# Patient Record
Sex: FEMALE | Race: WHITE
[De-identification: names, ages, dates, MRNs, and addresses within clinical notes are randomized per-mention and may not be internally consistent; named-entity substitution may affect disease eponyms.]

---

## 2017-12-29 ENCOUNTER — HOSPITAL ENCOUNTER (EMERGENCY)
Dept: HOSPITAL 25 - UCEAST | Age: 52
Discharge: HOME | End: 2017-12-29
Payer: COMMERCIAL

## 2017-12-29 VITALS — DIASTOLIC BLOOD PRESSURE: 78 MMHG | SYSTOLIC BLOOD PRESSURE: 130 MMHG
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
## 2017-12-29 DIAGNOSIS — M79.671: Primary | ICD-10-CM

## 2017-12-29 DIAGNOSIS — S29.011A: ICD-10-CM

## 2017-12-29 PROCEDURE — 99213 OFFICE O/P EST LOW 20 MIN: CPT

## 2017-12-29 PROCEDURE — G0463 HOSPITAL OUTPT CLINIC VISIT: HCPCS

## 2017-12-29 NOTE — RAD
INDICATION: Right rib pain     



COMPARISON: None



TECHNIQUE: Multiple views of the ribs were obtained.



FINDINGS: 



Bones: There is no evidence of acute rib fracture.



LUNGS: The lungs are clear. There is no pneumothorax.



Pleural spaces: There is no evidence of hemothorax.



Other: None 



IMPRESSION: NEGATIVE EXAMINATION.

## 2017-12-29 NOTE — UC
Breast Complaint





- HPI Summary


HPI Summary: 





Pt presents with two complaints


1) Right side pain that radiates under her right breast. This pain started 

about 2 months ago and is worse with movement. She works at a  and 

reports having to lift and carry children often - thinks maybe she could have 

injured something doing that, but doesn't recall a specific injury. She gets a 

mammogram yearly and has done self breast exams without abnormalities. She has 

not taken anything for this pain. Denies fever, chills, chest pain, SOB, 

abdominal pain, N/V/D/C, or pain with breathing.





2) She also complains of right plantar surface pain for the past month. She 

reports a stabbing pain on the foot pad just below her 2nd and 3rd toes. Is 

painful at all times of the day. She has not taken anything for this pain or 

tried any shoe inserts. She has not changed her footwear recently and denies 

injury.





- History of Current Complaint


Hx Obtained From: Patient





- Allergy/Home Medications


Allergies/Adverse Reactions: 


 Allergies











Allergy/AdvReac Type Severity Reaction Status Date / Time


 


No Known Allergies Allergy   Verified 12/29/17 09:38











Home Medications: 


 Home Medications





Acetaminophen [Tylenol 8 Hour Arthritis] 2 tab PO BID PRN 12/29/17 [History 

Confirmed 12/29/17]











PMH/Surg Hx/FS Hx/Imm Hx


Previously Healthy: Yes





- Surgical History


Surgical History: Yes


Surgery Procedure, Year, and Place: UTERINE FIBROID REMOVAL





- Family History


Known Family History: Positive: None





- Social History


Occupation: Employed Full-time


Lives: With Family


Alcohol Use: Occasionally


Substance Use Type: None


Smoking Status (MU): Light Every Day Tobacco Smoker


Type: Cigarettes


Amount Used/How Often: 5 CIG/DAY


Length of Time of Smoking/Using Tobacco: since age 20


Have You Smoked in the Last Year: Yes


Cessation Counseling: Counseled 3+Min - 10 Min





Review of Systems


Constitutional: Negative


Skin: Negative


Respiratory: Negative


Cardiovascular: Negative


Gastrointestinal: Negative


Motor: Negative


Neurovascular: Negative


Musculoskeletal: Other: - Pain right ribs. Pain right plantar foot.


Neurological: Negative


Psychological: Negative


All Other Systems Reviewed And Are Negative: Yes





Physical Exam


Triage Information Reviewed: Yes


Appearance: Well-Appearing, No Pain Distress, Well-Nourished


Vital Signs: 


 Initial Vital Signs











Temp  98.5 F   12/29/17 09:30


 


Pulse  81   12/29/17 09:30


 


Resp  16   12/29/17 09:30


 


BP  136/76   12/29/17 09:30


 


Pulse Ox  100   12/29/17 09:30











Vital Signs Reviewed: Yes


Neck: Positive: Supple, No Lymphadenopathy, Other: - NTTP. FROM.


Respiratory: Positive: Chest non-tender, Lungs clear, Normal breath sounds, No 

respiratory distress, No accessory muscle use


Cardiovascular: Positive: RRR, No Murmur, Pulses Normal


Abdomen Description: Positive: Nontender, No Organomegaly, Soft.  Negative: CVA 

Tenderness (R), CVA Tenderness (L), Distended, Guarding


Bowel Sounds: Positive: Present


Musculoskeletal: Positive: Strength Intact - Right UE and right foot/ankle, ROM 

Intact - Right UE and right foot/ankle, No Edema - Right UE and right foot/ankle

, Other: - TTP along ~8th rib from the right midaxillary line to the right 

sternum - worse with right shoulder flexion to 180 deg. No erythema, ecchymosis

, or edema. Right foot: TTP over plantar aspect at the base of the 2nd and 3rd 

toes - this reproduces stabbing/sharp pain. No erythema, ecchymosis, or edema.


Neurological: Positive: Alert, Muscle Tone Normal, Other: - Right UE sensations 

intact C4-T1. Right foot sensations intact right foot and all toes.


Psychological: Positive: Age Appropriate Behavior





- Additional Comments





Right breast exam. NTTP. No nipple discharge. No masses or nodules appreciated 

in breast or axilla.





Breast Pain Course/Dx





- Course


Course Of Treatment: Right ribs with CXR negative today. Suspect right 

intercostal muscle strain due to work habits and lifting of children - 

especially since this motion reproduces her pain. Will rx for voltaren gel and 

have her take tylenol for pain. Advised to rest and heat the area. She has a 

yearly mammogram scheduled for next week and a follow up with her PCP upcoming 

- I suggested she talk to her PCP about this pain if it persists.  For her foot

, it is possible that she has a burch's neuroma vs. fasciitis. She called 

podiatry and has an appt for late January. I advised her to keep this 

appointment for further eval. In the meantime, I suggested she try plantar shoe 

pads or inserts to see if this improves her pain. I have also supplied her with 

a post-op shoe to use as needed for comfort.





- Diagnoses


Provider Diagnoses: 


 Intercostal muscle strain, Foot pain, right








Discharge





- Discharge Plan


Condition: Stable


Disposition: HOME


Prescriptions: 


Diclofenac 1% GEL (NF) [Voltaren 1% GEL (NF)] 1 applic TOPICAL TID PRN #1 tube


 PRN Reason: Pain


Patient Education Materials:  Muscle Strain (ED), Burch Neuroma (ED)


Referrals: 


Castillo Naik MD [Primary Care Provider] - 


Additional Instructions: 


If you develop a fever, SOB, chest pain, new or worsening symptoms - please 

call your PCP or go to the ED.





1) Regarding your foot: Try the post-op shoe when active or walking around the 

house. May also try OTC shoe inserts or foot pads for relief with daily shoes. 

Please keep your appointment with podiatry in January - they can further 

evaluate and treat your foot pain.


2) Regarding the right side pain: Keep your scheduled mammorgram and follow up 

with your PCP. Avoid strenuous activity, lifting, and overhead motions. Try a 

heating pack and keep taking tylenol as needed.

## 2018-05-23 NOTE — HP
CC:  Castillo Naik MD *

 

PREOPERATIVE HISTORY AND PHYSICAL:

 

DATE OF ADMISSION:  18

 

This patient is scheduled for same-day surgery admission by Dr. Tyler on Friday
, 18.

 

ATTENDING SURGEON:  Aylcia Tyler MD * (dictated by Deborah Grimaldo NP).

 

CHIEF COMPLAINT:  Lump, left shoulder and lump left anterior chest wall.

 

HISTORY OF PRESENT ILLNESS:  The patient is a 52-year-old female recently 
evaluated by Dr. Tyler for a mass on her left shoulder that has been present 
for approximately 7 years.  The patient states it has increased in size from 
approximately the size of a peanut to now approximately the size of a lemon.  
She feels that this is affecting the movement of her left arm and she has an 
associated aching and pulling sensation in her left arm.  She has another 
lesion located on the left anterior chest wall that has been present for about 
2 years and it has also increased from pea size to its current almond size.  
She has also noticed redness around it.  Dr. Tyler has examined the patient 
and has recommended excision of the mass on her left shoulder, which is likely 
a lipoma as well as excision of the dermal lesion on the left anterior chest 
wall as a same-day surgery procedure at [facility].  Dr. Tyler 
described the nature of the surgical procedure, the relevant risks and 
benefits.  On today I reviewed the expected postoperative care and recovery.  
The patient has had a chance to ask questions and stated that she understands 
the information and is satisfied with the answers given to her questions.  She 
will sign surgical consent on the day of surgery.

 

PAST MEDICAL HISTORY:  Significant for anxiety and depression.

 

PAST SURGICAL HISTORY:  Removal of uterine fibroid.

 

OB HISTORY:   4, para 3, miscarriage 1.  Last pelvic exam in 2017 and 
she is up-to-date with mammogram.  She is postmenopausal since 2014.

 

MEDICATIONS: Escitalopram 10 mg daily at 9 p.m.

 

She is on the following supplements:

1.  Vitamin D3.

2.  Vitamin C.

3.  Osteo Bi-Flex.

 

ALLERGIES:  No known drug allergies.

 

FAMILY HISTORY:  Mother is diabetic, has hypertension and was diagnosed with a 
precancerous colon tumor; the patient's sister has CLL and her father is oxygen 
dependent.  No known anesthesia complications, bleeding tendencies or clotting 
disorders.

 

SOCIAL HISTORY:  She is  and her  will be with her on the day of 
surgery; she is a registered  provider; she smokes 5 or 6 cigarettes 
per day.  She denies the use of alcohol or other substances.

 

REVIEW OF SYSTEMS:  Constitutional:  No fevers, chills, night sweats, excessive 
fatigue or weight loss.  Endocrine:  No diabetes or thyroid disease.  
Hematologic: No easy bruising or bleeding.  She has never received a blood 
transfusion. Respiratory:  No dyspnea on exertion.  No chronic cough.  
Cardiovascular:  No anginal chest pain or palpitations.  Gastrointestinal:  No 
nausea, vomiting, persistent diarrhea or constipation.  Genitourinary:  No 
dysuria.  Musculoskeletal: Intermittent low back pain that extends around the 
right hip and onto the right thigh and generalized achiness.  Integumentary:  
Mass on the left shoulder and left anterior chest wall as described in history 
of present illness, most likely consistent with lipoma.  Neurologic:  No 
headache or blurred vision or areas of numbness.  General:  No previous 
anesthesia complications or bleeding tendencies or clotting disorders.

 

                               PHYSICAL EXAMINATION

 

GENERAL SURVEY:  The patient is a 52-year-old female, overweight, well developed
, in no acute distress.

 

VITAL SIGNS:  Height 65 inches, weight 210 pounds, body mass index 35.  Blood 
pressure 138/82, pulse 84 and regular, respiratory rate 18, temperature 97.8 
tympanic.

 

HEENT:  Benign.

 

NECK:  Supple.  No cervical lymphadenopathy.

 

BACK:  No CVA tenderness.  She reports tenderness in the region of the sacrum, 
but on exam there is no erythema or palpable mass or evidence of pilonidal 
disease.

 

LUNGS:  Breath sounds bilaterally clear and equal.

 

HEART:  Regular rate and rhythm.  No murmurs or rubs appreciated.

 

BREAST EXAM:  Done recently, not repeated.

 

ABDOMEN:  Active bowel sounds.  Obese, soft, nondistended, nontender 
throughout. No obvious masses, organomegaly, or evidence of umbilical hernia.  
She does have a belly button piercing.

 

PELVIC AND RECTAL EXAMS:  Deferred.

 

EXTREMITIES:  No edema or skin ulceration.

 

NEUROLOGIC:  Alert and oriented x3.  Steady gait.

 

SKIN:  Left shoulder with subcutaneous fleshy mass that is mobile and 
consistent with lipoma; lesion on left anterior chest wall is a dermal based 
lesion that is smooth and oval shaped and at its apex is a pigmented lesion 
that appears to be benign, but in the field that would be required for removal.

 

 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 
wall.

 

PLAN:  Same-day surgery admission to Dr. Tyler's service on Friday, 18, 
for excision of lipoma left shoulder and excision dermal lesion left anterior 
chest wall.

 

 ____________________________________ KEV GRIMALDO, NP

 

[id number]/CPS #: [id number]

MARNIE

## 2018-06-01 ENCOUNTER — HOSPITAL ENCOUNTER (OUTPATIENT)
Dept: HOSPITAL 25 - OR | Age: 53
Discharge: HOME | End: 2018-06-01
Attending: SURGERY
Payer: COMMERCIAL

## 2018-06-01 VITALS — DIASTOLIC BLOOD PRESSURE: 83 MMHG | SYSTOLIC BLOOD PRESSURE: 148 MMHG

## 2018-06-01 DIAGNOSIS — D21.3: ICD-10-CM

## 2018-06-01 DIAGNOSIS — M19.90: ICD-10-CM

## 2018-06-01 DIAGNOSIS — D17.1: Primary | ICD-10-CM

## 2018-06-01 DIAGNOSIS — E66.9: ICD-10-CM

## 2018-06-01 DIAGNOSIS — F41.8: ICD-10-CM

## 2018-06-01 PROCEDURE — 88304 TISSUE EXAM BY PATHOLOGIST: CPT

## 2018-06-01 PROCEDURE — 88342 IMHCHEM/IMCYTCHM 1ST ANTB: CPT

## 2018-06-01 PROCEDURE — 88305 TISSUE EXAM BY PATHOLOGIST: CPT

## 2018-06-01 PROCEDURE — 88341 IMHCHEM/IMCYTCHM EA ADD ANTB: CPT

## 2018-06-01 NOTE — BRIEFOPN
Brief Operative Note





- Surgery


Procedures: 


Procedures





ASPIRAT CURET-POST DELIV (00)


BILAT TUBAL DESTRUCT NEC (02)


D & C NEC (99)


FETAL MONITORING NOS (02)


HYSTEROSCOPY (99)


LOW CERVICAL  (02)


UTERINE LES DESTRUCT NEC (00)





18


Op Note


Pre-op dx:  mass of left shoulder and mass of left chest wall


Post-op dx: same


Procedure:  excision of left shoulder mass and left chest wall mass


Surgeon:  Jayson


Asst:  Oltz, PAS


Anesth:  local-MAC


EBL: 5 cc


SCDs on during surgery


ABx:  given pre-op 


Complications:  none


Pt. tolerated procedure well and was transferred to  in a stable condition.


CLFoster

## 2018-06-02 NOTE — OP
CC:  Dr. Castillo Naik *

 

DATE OF OPERATION:  06/01/18 - SDS

 

DATE OF BIRTH:  [birth year]

 

SURGEON:  Dr. Tyler

 

ASSISTANT:  PETRONA Christopher student.

 

PRE-OP DIAGNOSES:  Mass at the left shoulder.  Mass at the left chest wall.

 

POST-OP DIAGNOSES:  Mass at the left shoulder.  Mass at the left chest wall.

 

OPERATIVE PROCEDURE:  Excision of mass of left shoulder and excision of mass of 
left chest wall.

 

INDICATIONS:  Ms. López is a 52-year-old woman, who presented to the office 
with symptomatic mass at the left shoulder and new lesion of the left chest 
wall.  This prompted the plan for surgical intervention. 



DESCRIPTION OF PROCEDURE:  She was brought to the operating room, placed on the 
OR table in a supine position and given IV sedation.  The left chest and 
shoulder were prepped and draped in the usual sterile fashion.  After 
infiltrating with local anesthetic, an incision was made over the mass on the 
left shoulder and subcutaneous tissue was divided with electrocautery.  Then a 
combination of blunt and sharp dissection was used to the excise the mass, 
which appeared to be consistent with a multilobulated lipoma.  This was handed 
off as a specimen.  Hemostasis was achieved with electro-cautery and once this 
was adequate, closure was accomplished with 3-0 Vicryl in the subcutaneous 
layer and the skin was closed with 4-0 Prolene in a subcuticular fashion.  
Attention was then turned to the lesion of the anterior chest wall and this was 
excised in an elliptical fashion encompassing the lesion and the subcutaneous 
tissues divided with a combination of sharp and electrocautery dissection to 
completely excise the lesion.  This was marked with a stitch marking the medial 
apex and then handed off as a specimen. Hemostasis was achieved with 
electrocautery and once this was adequate, closure was accomplished with 3-0 
Vicryl in the subcutaneous layer and the skin was closed with 4-0 Prolene in a 
subcuticular fashion.  Steri-Strips and a dry sterile dressing were applied to 
the both sites.  All sponge and instrument counts were correct. The patient 
tolerated the procedure well and was transferred to recovery in a stable 
condition.

 

 [id number]/CPS #: [id number]

MTDD

## 2018-06-09 ENCOUNTER — HOSPITAL ENCOUNTER (EMERGENCY)
Dept: HOSPITAL 25 - UCEAST | Age: 53
Discharge: HOME | End: 2018-06-09
Payer: COMMERCIAL

## 2018-06-09 ENCOUNTER — HOSPITAL ENCOUNTER (EMERGENCY)
Dept: HOSPITAL 25 - ED | Age: 53
Discharge: HOME | End: 2018-06-09
Payer: COMMERCIAL

## 2018-06-09 VITALS — SYSTOLIC BLOOD PRESSURE: 159 MMHG | DIASTOLIC BLOOD PRESSURE: 94 MMHG

## 2018-06-09 VITALS — DIASTOLIC BLOOD PRESSURE: 82 MMHG | SYSTOLIC BLOOD PRESSURE: 149 MMHG
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
## 2018-06-09 DIAGNOSIS — S39.011A: Primary | ICD-10-CM
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
## 2018-06-09 DIAGNOSIS — F17.210: ICD-10-CM
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
## 2018-06-09 DIAGNOSIS — R11.0: ICD-10-CM
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
## 2018-06-09 DIAGNOSIS — F32.9: ICD-10-CM

## 2018-06-09 DIAGNOSIS — S30.1XXA: Primary | ICD-10-CM

## 2018-06-09 LAB
BASOPHILS # BLD AUTO: 0.1 10^3/UL (ref 0–0.2)
EOSINOPHIL # BLD AUTO: 0.2 10^3/UL (ref 0–0.6)
HCT VFR BLD AUTO: 42 % (ref 35–47)
HGB BLD-MCNC: 14.3 G/DL (ref 12–16)
LYMPHOCYTES # BLD AUTO: 2.3 10^3/UL (ref 1–4.8)
MCH RBC QN AUTO: 30 PG (ref 27–31)
MCHC RBC AUTO-ENTMCNC: 34 G/DL (ref 31–36)
MCV RBC AUTO: 89 FL (ref 80–97)
MONOCYTES # BLD AUTO: 0.9 10^3/UL (ref 0–0.8)
NEUTROPHILS # BLD AUTO: 5.3 10^3/UL (ref 1.5–7.7)
NRBC # BLD AUTO: 0 10^3/UL
NRBC BLD QL AUTO: 0
PLATELET # BLD AUTO: 408 10^3/UL (ref 150–450)
RBC # BLD AUTO: 4.71 10^6/UL (ref 4–5.4)
WBC # BLD AUTO: 8.6 10^3/UL (ref 3.5–10.8)

## 2018-06-09 PROCEDURE — 96360 HYDRATION IV INFUSION INIT: CPT

## 2018-06-09 PROCEDURE — 36415 COLL VENOUS BLD VENIPUNCTURE: CPT

## 2018-06-09 PROCEDURE — 74177 CT ABD & PELVIS W/CONTRAST: CPT

## 2018-06-09 PROCEDURE — 99283 EMERGENCY DEPT VISIT LOW MDM: CPT

## 2018-06-09 PROCEDURE — 86140 C-REACTIVE PROTEIN: CPT

## 2018-06-09 PROCEDURE — 99212 OFFICE O/P EST SF 10 MIN: CPT

## 2018-06-09 PROCEDURE — 83690 ASSAY OF LIPASE: CPT

## 2018-06-09 PROCEDURE — 80053 COMPREHEN METABOLIC PANEL: CPT

## 2018-06-09 PROCEDURE — 85025 COMPLETE CBC W/AUTO DIFF WBC: CPT

## 2018-06-09 PROCEDURE — 81003 URINALYSIS AUTO W/O SCOPE: CPT

## 2018-06-09 PROCEDURE — G0463 HOSPITAL OUTPT CLINIC VISIT: HCPCS

## 2018-06-09 NOTE — RAD
CLINICAL HISTORY: Left lower quadrant pain. Relevant surgical history includes "uterine

fibroid removal in 2001"



COMPARISON: None



TECHNIQUE: Contrast enhanced CT examination of the abdomen and pelvis from the lung bases

through the initial tuberosities. The patient received 131 mL Omnipaque 300 intravenously

prior to imaging.



FINDINGS:



VISUALIZED LUNG BASES: 

The visualized lung bases are grossly clear. There is no pleural effusion.



ABDOMEN AND PELVIS:



The liver, spleen, pancreas and adrenal glands are grossly normal in appearance. 



The gallbladder is normal.



The kidneys are normal in appearance without focal mass, calcification or signs of

hydronephrosis.



Evaluation of the gastrointestinal tract is limited without oral contrast. The small and

large bowel are not distended. The patient's normal appendix is identified in the right

lower quadrant with gas in the lumen measuring just under 5 mm in diameter (coronal image

44)..



There is no gross retroperitoneal or mesenteric lymphadenopathy.



The pelvic viscera is normal in appearance.



There is atherosclerotic calcification of the infrarenal abdominal aorta extending into

the iliac arteries.



Degenerative changes include multilevel loss of intervertebral disc height involving the

lower thoracic and lumbar spine.There are no sinister bone lesions.



IMPRESSION:



1. No definite CT apparent evidence of gastroenteritis within the limitations of a nonoral

contrast CT examination.

2. No signs of obstructive uropathy.

3. Additional chronic and degenerative changes described in the body the report unlikely

to be directly related to the patient's current presentation.

## 2018-06-09 NOTE — ED
Abdominal Pain/Female





- HPI Summary


HPI Summary: 


Pt. is a 52 y.o female who presents to the ER for abdominal pain x 3-4 days. 

Pain is located to the LLQ and is sharp in nature.  Pain is intermittent and 

worse with movement.  Symptoms of nausea.  No associated symptoms of vomiting, 

diarrhea, constipation, urinary symptoms.  Surgical history of  and 

fibroid removal.  Symptoms are moderate in severity.  Patient has no 

significant past medical history. Pt. also notes that she tripped and fall over 

her open  the day before pain started. She sustained numerous bruises 

to arms and legs. No head injury. 








- History of Current Complaint


Chief Complaint: EDAbdPain


Stated Complaint: LT SIDE PAIN LOW ABDN


Time Seen by Provider: 18 09:35


Hx Obtained From: Patient


Pain Intensity: 3


Allergies/Adverse Reactions: 


 Allergies











Allergy/AdvReac Type Severity Reaction Status Date / Time


 


No Known Allergies Allergy   Verified 18 09:01














PMH/Surg Hx/FS Hx/Imm Hx


Previously Healthy: Yes


Musculoskeletal History: Reports: Hx Arthritis - ALL JOINTS, Hx Bursitis - 

RIGHT LEG, Hx Tendonitis - LEFT HAND, Other Musculoskeletal History - NEGATIVE 

FOR RHEUMATOID ARTHRITIS


Sensory History: 


   Denies: Hx Contacts or Glasses, Hx Hearing Aid


Opthamlomology History: 


   Denies: Hx Contacts or Glasses


Psychiatric History: Reports: Hx Anxiety, Hx Depression





- Cancer History


Hx Chemotherapy: No


Hx Radiation Therapy: No





- Surgical History


Surgery Procedure, Year, and Place:  UTERINE FIBROID REMOVAL, [facility] [place].   

CSECTION, [facility] [place]


Hx Anesthesia Reactions: No


Infectious Disease History: No


Infectious Disease History: 


   Denies: Traveled Outside the [place] in Last 30 Days





- Family History


Known Family History: Positive: None





- Social History


Occupation: Employed Full-time


Lives: With Family


Alcohol Use: None


Alcohol Amount: 2 TIMES A YEAR


Substance Use Type: Reports: None


Smoking Status (MU): Light Every Day Tobacco Smoker


Type: Cigarettes


Amount Used/How Often: 1/2 ppd


Length of Time of Smoking/Using Tobacco: SINCE AGE 21


Have You Smoked in the Last Year: Yes





Review of Systems


Constitutional: Negative


Negative: Fever, Chills


Positive: Abdominal Pain, Nausea.  Negative: Vomiting, Diarrhea


Genitourinary: Negative


All Other Systems Reviewed And Are Negative: Yes





Physical Exam


Triage Information Reviewed: Yes


Vital Signs On Initial Exam: 


 Initial Vitals











Temp Pulse Resp BP Pulse Ox


 


 96.7 F   80   14   159/80   98 


 


 18 09:01  18 09:01  18 09:01  18 09:01  18 09:01











Vital Signs Reviewed: Yes


Appearance: Positive: Well-Appearing - Pt. lying in bed in no acute distress.


Skin: Positive: Warm, Dry


Head/Face: Positive: Normal Head/Face Inspection


Eyes: Positive: Normal


Abdomen Description: Positive: Soft, Other: - Mildly distended.  Abdomen is 

soft throughout with mild tenderness to the left and right lower quadrants.  No 

rebound tenderness or guarding.


Neurological: Positive: Normal, CN Intact II-III


Psychiatric: Positive: Affect/Mood Appropriate





Diagnostics





- Vital Signs


 Vital Signs











  Temp Pulse Resp BP Pulse Ox


 


 18 10:45   66   160/108  97


 


 18 10:15   69   153/87  96


 


 18 10:00   76    97


 


 18 09:45   73   145/97  97


 


 18 09:35   78    98


 


 18 09:01  96.7 F  80  14  159/80  98














- Laboratory


Lab Results: 


 Lab Results











  18 Range/Units





  09:51 09:51 10:54 


 


WBC  8.6    (3.5-10.8)  10^3/ul


 


RBC  4.71    (4.0-5.4)  10^6/ul


 


Hgb  14.3    (12.0-16.0)  g/dl


 


Hct  42    (35-47)  %


 


MCV  89    (80-97)  fL


 


MCH  30    (27-31)  pg


 


MCHC  34    (31-36)  g/dl


 


RDW  14    (10.5-15)  %


 


Plt Count  408    (150-450)  10^3/ul


 


MPV  6.7 L    (7.4-10.4)  um3


 


Neut % (Auto)  61.3    (38-83)  %


 


Lymph % (Auto)  26.1    (25-47)  %


 


Mono % (Auto)  10.0 H    (0-7)  %


 


Eos % (Auto)  1.8    (0-6)  %


 


Baso % (Auto)  0.8    (0-2)  %


 


Absolute Neuts (auto)  5.3    (1.5-7.7)  10^3/ul


 


Absolute Lymphs (auto)  2.3    (1.0-4.8)  10^3/ul


 


Absolute Monos (auto)  0.9 H    (0-0.8)  10^3/ul


 


Absolute Eos (auto)  0.2    (0-0.6)  10^3/ul


 


Absolute Basos (auto)  0.1    (0-0.2)  10^3/ul


 


Absolute Nucleated RBC  0    10^3/ul


 


Nucleated RBC %  0    


 


Sodium   137 L   (139-145)  mmol/L


 


Potassium   4.2   (3.5-5.0)  mmol/L


 


Chloride   104   (101-111)  mmol/L


 


Carbon Dioxide   28   (22-32)  mmol/L


 


Anion Gap   5   (2-11)  mmol/L


 


BUN   14   (6-24)  mg/dL


 


Creatinine   0.65   (0.51-0.95)  mg/dL


 


Est GFR ( Amer)   124.1   (>60)  


 


Est GFR (Non-Af Amer)   96.5   (>60)  


 


BUN/Creatinine Ratio   21.5 H   (8-20)  


 


Glucose   95   ()  mg/dL


 


Calcium   9.3   (8.6-10.3)  mg/dL


 


Total Bilirubin   0.20   (0.2-1.0)  mg/dL


 


AST   22   (13-39)  U/L


 


ALT   36   (7-52)  U/L


 


Alkaline Phosphatase   80   ()  U/L


 


C-Reactive Protein   11.33 H   (< 5.00)  mg/L


 


Total Protein   7.0   (6.4-8.9)  g/dL


 


Albumin   3.9   (3.2-5.2)  g/dL


 


Globulin   3.1   (2-4)  g/dL


 


Albumin/Globulin Ratio   1.3   (1-3)  


 


Lipase   29   (11.0-82.0)  U/L


 


Urine Color    Yellow  


 


Urine Appearance    Clear  


 


Urine pH    7.0  (5-9)  


 


Ur Specific Gravity    1.016  (1.010-1.030)  


 


Urine Protein    Negative  (Negative)  


 


Urine Ketones    Negative  (Negative)  


 


Urine Blood    Negative  (Negative)  


 


Urine Nitrate    Negative  (Negative)  


 


Urine Bilirubin    Negative  (Negative)  


 


Urine Urobilinogen    Negative  (Negative)  


 


Ur Leukocyte Esterase    Negative  (Negative)  


 


Urine Glucose    Negative  (Negative)  











Result Diagrams: 


 18 09:51





 18 09:51


Lab Statement: Any lab studies that have been ordered have been reviewed, and 

results considered in the medical decision making process.





Abdominal Pain Fem Course/Dx





- Course


Course Of Treatment: Presenting for a several day history of lower abdominal 

pain and nausea.  She is afebrile with stable vital signs.  Labs and CT scan 

were ordered.  Patient declines anti-emetics or analgesics at this time.  Labs 

and urine are unremarkable. CT scan is negative for acute findings, reading per 

radiology. Results were discussed with pt. She took motrin PTA and it has 

helped with her pain. Suspect muscle strain given recent fall. Advised to ice 

and continue NSAIDS. Activity as tolerated. To f.u with PCP if symptoms persist 

and to return to ER for increased pain, fever, vomiting or if concerned. Pt. 

understands and agrees with plan.





- Diagnoses


Differential Diagnosis: Positive: Appendicitis, Bowel Obstruction, Constipation

, Diverticulitis, Renal Colic, Urinary Tract Infection


Provider Diagnoses: 


 Strain of abdominal muscle, Abdominal pain








Discharge





- Sign-Out/Discharge


Documenting (check all that apply): Discharge/Admit/Transfer





- Discharge Plan


Condition: Good


Disposition: HOME


Patient Education Materials:  Muscle Strain (ED), Abdominal Pain (ED)


Referrals: 


Castillo Naik MD [Primary Care Provider] - 


Additional Instructions: 


Call PCP for follow up 


Ice intermittently


NSAIDS for pain as directed


Avoid heavy lifting


Return to ER for increased pain, fever, vomiting or if concerned





- Billing Disposition and Condition


Condition: GOOD


Disposition: Home

## 2018-06-09 NOTE — XMS REPORT
Susan Esquivel

 Created on:May 23, 2018



Patient:Susan Esquivel

Sex:Female

:1965

External Reference #:[id number]





Demographics







 Address  [address] [place] [address]

 

 Home Phone  [phone]

 

 Mobile Phone  [phone]

 

 Email Address  [email or web address]

 

 Preferred Language  English

 

 Marital Status  Not  Or 

 

 Hindu Affiliation  Unknown

 

 Race  White

 

 Ethnic Group  Not  Or 









Author







 Organization  [email or web address]

 

 Address  [address] [address]

 

 Phone  [phone]









Support







 Name  Relationship  Address  Phone

 

 Lupillo Esquivel  Unavailable  [phone]









Care Team Providers







 Name  Role  Phone

 

 Violeta Kwan PA  Care Team Information   Unavailable

 

 Castillo Naik MD  Primary Care Physician  Unavailable









Payers







 Type  Date  Identification Numbers  Payment Provider  Subscriber

 

 Health Maintenance    Policy Number:  [facility]  Lupillo Esquivel



 [facility] (O)    [id number]    









 PayID: [id number]  [address] [address]









 [place] [address]







Problems







 Date  Description  Provider  Status

 

 Onset: 2016  Chest pain  Jose Conde M.D., [facility], [facility]  Active







Family History







 Date  Family Member(s)  Problem(s)  Comments

 

   General  Hypertension  

 

   Father  MI  age 58

 

 Onset:  (age 30 Years)  Mother  Hypertension  

 

 Onset:  (age 18 Years)  First Son  Hypertension  







Social History







 Type  Date  Description  Comments

 

 Marital Status      

 

 Lives With      

 

 Occupation    Childcare  

 

 Cigarette Use    Former Cigarette Smoker  

 

 ETOH Use    Rarely consumes wine  

 

 Recreational Drug Use    Denies Drug Use  

 

 Smoking    Light tobacco smoker (10 or fewer  



     cigarettes/day)  

 

 Daily Caffeine    Consumes on average 2 cups of regular  



     coffee per day  

 

 Exercise Type/Frequency  2016  Walks daily  30-45 mins







Allergies, Adverse Reactions, Alerts







 Date  Description  Reaction  Status  Severity  Comments

 

 2012  NKDA    active    







Medications







 Medication  Date  Status  Form  Strength  Qnty  SIG  Indications  Ordering



                 Provider

 

 Acetaminophen    Active  Tablets  500mg    1 tab by    Unknown



 Extra Strength  000          mouth    



             twice a    



             day as    



             needed    

 

 Vitamin C    Active  Capsules  500-400mg-    1 by    Unknown



   000      Unit    mouth    



 W/Vitamin D            every day    

 

 Escitalopram  00/00/0  Active  Tablets  10mg    1 by    Unknown



 Oxalate  000          mouth    



             every day    

 

 Osteo Bi-Flex    Active  Tablets  250-200mg    1 by    Unknown



 Regular Strength  000          mouth    



             every day    

 

 Vitamin B    Active  Tablets      1 by    Unknown



 Complex  000          mouth    



             every day    

 

 Turmeric    Active  Capsules  500mg    take one    Unknown



   000          capsule/t    



             ablet    



             daily by    



             mouth    

 

                 

 

 Aleve    Hx  Tablets  220mg    2 tablets    Unknown



   000 -          as needed    



   10/05/2              



   016              

 

 Mucinex Fast-Max    Hx  Liquid  -400    1 cap as    Unknown



 Cold &amp; Sinus  000      mg/20ML    needed    







Medications Administered in Office







 Medication  Date  Status  Form  Strength  Qnty  SIG  Indications  Ordering



                 Provider

 

 Technetium TC  10/14/2  Administered  Injection          Ck SSam



 99M  016              DO Taz



 Tetrofosmin,                FACC



 Per Unit Dose                



 Up To 40                



 Millicuries                







Vital Signs







 Date  Vital  Result  Comment

 

 2018  Height  65 inches  5'5"









 Weight  210.00 lb  

 

 Heart Rate  84 /min  

 

 BP Systolic Sitting  138 mmHg  

 

 BP Diastolic Sitting  82 mmHg  

 

 Respiratory Rate  18 /min  

 

 Body Temperature  97.8 F  

 

 BMI (Body Mass Index)  34.9 kg/m2  









 2018  Height  65 inches  5'5"









 Weight  210.00 lb  

 

 Heart Rate  72 /min  

 

 BP Systolic Sitting  152 mmHg  

 

 BP Diastolic Sitting  84 mmHg  

 

 Respiratory Rate  18 /min  

 

 Body Temperature  98.1 F  

 

 BMI (Body Mass Index)  34.9 kg/m2  









 10/25/2016  Height  64.5 inches  5'4.50"









 Weight  199.50 lb  no shoes

 

 Heart Rate  78 /min  

 

 BP Systolic Sitting  132 mmHg  Rue reg cuff

 

 BP Diastolic Sitting  76 mmHg  Rue reg cuff

 

 BP Systolic Standing  126 mmHg  Rue reg cuff

 

 BP Diastolic Standing  78 mmHg  Rue reg cuff

 

 Respiratory Rate  16 /min  

 

 BMI (Body Mass Index)  33.7 kg/m2  

 

 Ejection Fraction  55-60%  2016  Height  64.5 inches  5'4.50"









 Weight  196.00 lb  no shoes

 

 Heart Rate  84 /min  

 

 BP Systolic  158 mmHg  Ra lrg cuff

 

 BP Diastolic  96 mmHg  Ra lrg cuff

 

 BP Systolic Sitting  154 mmHg  LA lrg cuff

 

 BP Diastolic Sitting  90 mmHg  LA lrg cuff

 

 BP Systolic Standing  140 mmHg  LA lrg cuff

 

 BP Diastolic Standing  90 mmHg  LA lrg cuff

 

 Respiratory Rate  17 /min  

 

 BMI (Body Mass Index)  33.1 kg/m2  

 

 Ejection Fraction  55-60%  2013







Results







 Test  Date  Test  Result  H/L  Range  Note

 

 Laboratory test  2018  Surgical Pathology  SEE RESULT BELOW      1, 2



 finding            









 1  [id number]

 

 2  SEE RESULT BELOW



   -----------------------------------------------------------------------------
---------------



   Name:  SUSAN ESQUIVEL                   : 1965    Attend Dr: Arvin Soto MD



   Acct:  [id number]  Unit: [id number]  AGE: 52            Location:  [facility]



   Re18                        SEX: F             Status:    DEP REF



   -----------------------------------------------------------------------------
---------------



   



   SPEC: [id number]             CHELO: 18-          SUBM DR: Arvin Soto MD



   REQ:  [id number]             RECD: 



   STATUS: YANELIS TRAYLOR DR: Castillo Naik MD



   _



   ORDERED:  LEVEL 4/2



   COMMENTS: [id number]



   



   FINAL DIAGNOSIS



   



   



   1.  Colon, 80 cm, biopsy:



   -- Hyperplastic polyp.



   



   2.   Colon, 20 cm, biopsy:



   -- Hyperplastic polyp.



   



   



   



   CLINICAL HISTORY



   



   Screening/Surveillance for malignancy in asymptomatic patient.



   



   POST-OPERATIVE DIAGNOSIS



   



   Colonoscopy to terminal ileum, prep excellent ? 5 mm polyp at 80 cm; 5 mm 
polyp at 20 cm.



   Conclusions/Plan: Await pathology



   



   GROSS DESCRIPTION



   



   1.   The specimen is received in formalin labeled, Colon Polyp at 80 cm, and 
consists of a



   0.3 x 0.2 x 0.2 cm tan-pink polypoid soft tissue fragment which is submitted 
entirely in one



   cassette.



   



   2.   The specimen is received in formalin labeled, Colon Polyp at 20 cm, and 
consists of two



   tan-pink polypoid soft tissue fragments averaging 0.3 x 0.2 x 0.2 cm which 
are submitted



   entirely in one cassette.



   



   Signed __________(signature on file)___________ Angel Queen MD  0950



   



   -----------------------------------------------------------------------------
---------------



   



   



   



   ** END OF REPORT **



   



   * ML=Testing performed at Main Lab



   DEPARTMENT OF PATHOLOGY,  [address] [place] [address]



   Phone # [phone]      Fax #[phone]



   Angel Queen M.D. Director     [facility] # [id number]







Procedures







 Date  CPT Code  Description  Status

 

 2018  59902  Colonoscopy Flexible W/Biopsy  Completed

 

 2018    Colonoscopy  Completed

 

 10/04/2017    Colonoscopy  Completed

 

 10/14/2016  87761  Stress Test  Completed

 

 10/14/2016  08864  Myocardial Perfusion Imaging Tomographic (Spect)  Completed



     Multiple Studies  

 

 2016  12768  ECHO Transthoracic, Real-Time 2D With Doppler And Color  
Completed



     Flow  

 

 2016  09665  EKG Tracing &amp; Interpretation  Completed

 

 2013  34080  Holter Monitoring 24 HR New  Completed

 

 2013  59975  ECHO Stress Test Incl Perf Contiuous ekg Monitoring  
Completed



     W/Phys Superv  

 

 2013  14423  ECHO Transthoracic, Real-Time 2D With Doppler And Color  
Completed



     Flow  

 

 2012  93576  EKG Tracing &amp; Interpretation  Completed







Encounters







 Type  Date  Location  Provider  CPT E/M  Dx

 

 Office Visit  10/25/2016  [facility] Norma Conde,  39410  R07.9



   2:00p  [facility], JACQUIE, [facility]    

 

 Office Visit  2016  [facility] Norma Conde,  79637  R07.9



   2:00p  [facility], REMY, [facility]    

 

 Office Visit  2013  [facility] Norma Conde,  85636  
786.50



   3:15p  [facility], FACC, [facility]    

 

 Office Visit  2012  [facility]  Jose Conde,  00726  
786.50



   12:15p  [facility], [facility], [facility]    









 785.1







Plan of Care

Future Appointment(s):2018  3:15 pm - PETRONA Lou at [facility]2018  9:15 am - Alycia Tyler MD at [facility]2018 - Deborah Grimaldo, NPD17.22 Benign 
lipomatous neoplasm of skin, subcu of left armFollow up:gctzlbD08.20 Benign 
lipomatous neoplasm of skin, subcu of unsp limbZ01.818 Encounter for other 
preprocedural examination

## 2018-06-09 NOTE — UC
UC General HPI





- HPI Summary


HPI Summary: 





Patient states that on 6/6/18 she tripped over the door of her  and 

the tray was dislodged as she landed on it, she sustained bruises on arms, legs 

and abdominal area. She started feeling low abdominal pain on left side since 

yesterday and as she stretched in bed it would feel like a pull, the pain 

usually dull "like ovulation pain" but very intense and sharp when she coughs 

and strains during BM. She denies n/v/d or abnormal stools. Pain is also 

present when she sits up or lays down in bed. She states she gained 30 lbs 

since menopause 4 years ago





- History of Current Complaint


Chief Complaint: UCAbdominalPain


Stated Complaint: PAIN ON SIDE


Time Seen by Provider: 06/09/18 07:46


Hx Obtained From: Patient


Onset/Duration: Sudden Onset, Lasting Days


Onset Severity: Mild


Current Severity: Moderate


Pain Intensity: 4


Pain Location at: left lower quadrant


Associated Signs & Symptoms: Positive: Abdominal Pain





- Allergy/Home Medications


Allergies/Adverse Reactions: 


 Allergies











Allergy/AdvReac Type Severity Reaction Status Date / Time


 


No Known Allergies Allergy   Verified 06/09/18 09:01














PMH/Surg Hx/FS Hx/Imm Hx


Psychological History: Depression





- Surgical History


Surgical History: Yes


Surgery Procedure, Year, and Place: 2001 UTERINE FIBROID REMOVAL, [facility] [place]  2002 

CSECTION, [facility]





- Family History


Known Family History: Positive: None





- Social History


Alcohol Use: Rare


Alcohol Amount: 2 TIMES A YEAR


Substance Use Type: None


Smoking Status (MU): Heavy Every Day Tobacco Smoker


Type: Cigarettes


Amount Used/How Often: 1/2 ppd


Length of Time of Smoking/Using Tobacco: SINCE AGE 21


Have You Smoked in the Last Year: Yes


When Did the Patient Quit Smoking/Using Tobacco: SINCE AGE 21





Review of Systems


Gastrointestinal: Abdominal Pain


All Other Systems Reviewed And Are Negative: Yes





Physical Exam


Triage Information Reviewed: Yes


Appearance: Well-Appearing, No Pain Distress, Obese


Vital Signs: 


 Initial Vital Signs











Temp  97.4 F   06/09/18 07:35


 


Pulse  85   06/09/18 07:35


 


Resp  16   06/09/18 07:35


 


BP  149/82   06/09/18 07:35


 


Pulse Ox  100   06/09/18 07:35











Vital Signs Reviewed: Yes


Eyes: Positive: Conjunctiva Clear


ENT: Positive: Hearing grossly normal, Uvula midline


Neck: Positive: Supple, Nontender, No Lymphadenopathy


Respiratory: Positive: Chest non-tender, Lungs clear, Normal breath sounds, No 

respiratory distress


Cardiovascular: Positive: RRR, No Murmur, Pulses Normal, Brisk Capillary Refill


Abdomen Description: Positive: No Organomegaly, Soft, Other: - pinpoint 

tenderness upon palpation LLQ, no hernia noted upon valsalva as patient is 

standing up, no distension or guarding


Bowel Sounds: Positive: Present


Musculoskeletal: Positive: Strength Intact, ROM Intact, No Edema





Course/Dx





- Course


Course Of Treatment: abdominal contusion, possible fascial sprain, no 

discernible hernia on physical exam, f/u with PCP and start ibuprofen as needed





- Differential Dx - Multi-Symptom


Provider Diagnoses: abdominal contusion





Discharge





- Sign-Out/Discharge


Documenting (check all that apply): Discharge/Admit/Transfer





- Discharge Plan


Condition: Good


Disposition: HOME


Patient Education Materials:  Contusion in Adults (ED), Ibuprofen (By mouth)


Referrals: 


Castillo Naik MD [Primary Care Provider] - 


Additional Instructions: 


follow up with Primary care for re-evaluation and assessment for need of 

further imaging. 





- Billing Disposition and Condition


Condition: GOOD


Disposition: Home

## 2018-06-09 NOTE — XMS REPORT
Susan Esquivel

 Created on:2018



Patient:Susan Esquivel

Sex:Female

:1965

External Reference #:[id number]





Demographics







 Address  [address] [place] [address]

 

 Home Phone  [phone]

 

 Mobile Phone  [phone]

 

 Email Address  [email or web address]

 

 Preferred Language  English

 

 Marital Status  Not  Or 

 

 Yarsani Affiliation  Unknown

 

 Race  White

 

 Ethnic Group  Not  Or 









Author







 Organization  [employer]

 

 Address  [address]



   [place] [address]

 

 Phone  [phone]









Support







 Name  Relationship  Address  Phone

 

 Lupillo Esquivel  Unavailable  [phone]









Care Team Providers







 Name  Role  Phone

 

 Violeta Kwan PA  Care Team Information   Unavailable

 

 Castillo Naik MD  Primary Care Physician  Unavailable









Payers







 Type  Date  Identification Numbers  Payment Provider  Subscriber

 

 Health Maintenance    Policy Number:  [facility]  Lupillo Esquivel



 Organization (O)    [id number]    









 PayID: [id number]  [address] [id number]









 [place] [address]







Problems







 Date  Description  Provider  Status

 

 Onset: 2016  Chest pain  Jose Conde M.D., [facility]  Active







Family History







 Date  Family Member(s)  Problem(s)  Comments

 

   General  Hypertension  

 

   Father  MI  age 58

 

 Onset:  (age 30 Years)  Mother  Hypertension  

 

 Onset:  (age 18 Years)  First Son  Hypertension  







Social History







 Type  Date  Description  Comments

 

 Marital Status      

 

 Lives With      

 

 Occupation    Childcare  

 

 Cigarette Use    Former Cigarette Smoker  

 

 ETOH Use    Rarely consumes wine  

 

 Recreational Drug Use    Denies Drug Use  

 

 Smoking    Light tobacco smoker (10 or fewer  



     cigarettes/day)  

 

 Daily Caffeine    Consumes on average 2 cups of regular  



     coffee per day  

 

 Exercise Type/Frequency  2016  Walks daily  30-45 mins







Allergies, Adverse Reactions, Alerts







 Date  Description  Reaction  Status  Severity  Comments

 

 2012  NKDA    active    







Medications







 Medication  Date  Status  Form  Strength  Qnty  SIG  Indications  Ordering



                 Provider

 

 Acetaminophen    Active  Tablets  500mg    1 tab by    Unknown



 Extra Strength  000          mouth    



             twice a    



             day as    



             needed    

 

 Vitamin C    Active  Capsules  500-400mg-    1 by    Unknown



   000      Unit    mouth    



 W/Vitamin D            every day    

 

 Escitalopram    Active  Tablets  10mg    1 by    Unknown



 Oxalate  000          mouth    



             every day    

 

 Osteo Bi-Flex    Active  Tablets  250-200mg    1 by    Unknown



 Regular Strength  000          mouth    



             every day    

 

 Vitamin B    Active  Tablets      1 by    Unknown



 Complex  000          mouth    



             every day    

 

 Turmeric    Active  Capsules  500mg    take one    Unknown



   000          capsule/t    



             ablet    



             daily by    



             mouth    

 

                 

 

 Aleve    Hx  Tablets  220mg    2 tablets    Unknown



   000 -          as needed    



   10/05/2              



   016              

 

 Mucinex Fast-Max    Hx  Liquid  -400    1 cap as    Unknown



 Cold &amp; Sinus  000      mg/20ML    needed    







Medications Administered in Office







 Medication  Date  Status  Form  Strength  Qnty  SIG  Indications  Ordering



                 Provider

 

 Technetium TC  10/14/2  Administered  Injection          Ck SSam



 99M  016              DO Taz



 Tetrofosmin,                FACC



 Per Unit Dose                



 Up To 40                



 Millicuries                







Vital Signs







 Date  Vital  Result  Comment

 

 2018  Heart Rate  76 /min  









 Respiratory Rate  18 /min  

 

 Body Temperature  97.7 F  









 2018  Height  65 inches  5'5"









 Weight  210.00 lb  

 

 Heart Rate  84 /min  

 

 BP Systolic Sitting  138 mmHg  

 

 BP Diastolic Sitting  82 mmHg  

 

 Respiratory Rate  18 /min  

 

 Body Temperature  97.8 F  

 

 BMI (Body Mass Index)  34.9 kg/m2  









 2018  Height  65 inches  5'5"









 Weight  210.00 lb  

 

 Heart Rate  72 /min  

 

 BP Systolic Sitting  152 mmHg  

 

 BP Diastolic Sitting  84 mmHg  

 

 Respiratory Rate  18 /min  

 

 Body Temperature  98.1 F  

 

 BMI (Body Mass Index)  34.9 kg/m2  









 10/25/2016  Height  64.5 inches  5'4.50"









 Weight  199.50 lb  no shoes

 

 Heart Rate  78 /min  

 

 BP Systolic Sitting  132 mmHg  Rue reg cuff

 

 BP Diastolic Sitting  76 mmHg  Rue reg cuff

 

 BP Systolic Standing  126 mmHg  Rue reg cuff

 

 BP Diastolic Standing  78 mmHg  Rue reg cuff

 

 Respiratory Rate  16 /min  

 

 BMI (Body Mass Index)  33.7 kg/m2  

 

 Ejection Fraction  55-60%  2016  Height  64.5 inches  5'4.50"









 Weight  196.00 lb  no shoes

 

 Heart Rate  84 /min  

 

 BP Systolic  158 mmHg  Ra lrg cuff

 

 BP Diastolic  96 mmHg  Ra lrg cuff

 

 BP Systolic Sitting  154 mmHg  LA lrg cuff

 

 BP Diastolic Sitting  90 mmHg  LA lrg cuff

 

 BP Systolic Standing  140 mmHg  LA lrg cuff

 

 BP Diastolic Standing  90 mmHg  LA lrg cuff

 

 Respiratory Rate  17 /min  

 

 BMI (Body Mass Index)  33.1 kg/m2  

 

 Ejection Fraction  55-60%  2013







Results







 Test  Date  Test  Result  H/L  Range  Note

 

 Laboratory test  2018  Surgical Pathology  SEE RESULT BELOW      1



 finding            

 

 Laboratory test  2018  Surgical Pathology  SEE RESULT BELOW      2, 3



 finding            









 1  SEE RESULT BELOW



   -----------------------------------------------------------------------------
---------------



   Name:  SUSAN ESQUIVEL                   : 1965    Attend Dr: Alycia Tyler MD



   Acct:  [id number]  Unit: [id number]  AGE: 52            Location:  OR



   Re18                        SEX: F             Status:    [facility]



   -----------------------------------------------------------------------------
---------------



   



   SPEC: [id number]             CHELO: 18-          SUBM DR: Alycia Tyler MD



   REQ:  [id number]             RECD: 



   STATUS: SOUT



   _



   ORDERED:  LEVEL 3, LEVEL 4, IMMUNO-FIRST, IMMUNO-ADDL



   



   FINAL DIAGNOSIS



   



   



   1.  Soft tissue, left shoulder, excision:



   -- Mature adipose tissue compatible with benign lipoma.



   



   2.   Skin and subcutaneous tissue left chest wall, excision:



   -- Granular cell tumor.  See comment.



   -- Deep, tip and lateral margins of resection are clear.



   



   



   



   Comment: Immunochemical stains for CD68 and S100 are performed



   with appropriate controls on block D.  Both stains are



   markedly positive, supporting the above rendered diagnosis.



   No evidence of atypia identified.



   



   PRE-OPERATIVE DIAGNOSIS



   



   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 
anterior chest wall,



   2) suture at medial apex



   



   GROSS DESCRIPTION



   



   1.   The specimen is received in formalin labeled, Mass Left Shoulder, and 
consists of a 5.5



   x 3.9 x 2.1 cm aggregate of yellow lobulated partially encapsulated adipose 
tissue



   fragments.  The cut surface is glistening yellow and lobulated.  The 
specimen is inked,



   serially sectioned and representative sections are submitted in one cassette.



   



   2.   The specimen is received in formalin labeled, Left Chest Wall Lesion 
with Stitch at



   Medial [place], and consists of a 3.3 x 1.2 cm wrinkled tan-pink skin ellipse 
excised to a



   maximum depth of 1.5 cm.  There is a suture attached to one long axis which 
designates the



   medial apex; the suture is redesignated 12:00.  The specimen is inked as 
follows: 3:00 half



   blue, 9:00 half black and 12:00 tip green, serially sectioned from 12:00 to 6
:00 and



   submitted entirely in cassettes A through E to include ellipse ends in 
cassette A.



   



   



   ** CONTINUED ON NEXT PAGE **



   



   DEPARTMENT OF PATHOLOGY,  [address] [place] [address]



   Phone # [phone]      Fax #[phone]



   Angel Queen M.D. Director     [facility] # [id number]



   



   



   



   RUN DATE: 18               [facility] **LIVE**         
       PAGE    2



   



   -----------------------------------------------------------------------------
---------------



   Patient: SUSAN ESQUIVEL                    [id number]     (Continued)



   -----------------------------------------------------------------------------
---------------



   



   GROSS DESCRIPTION          (Continued)



   



   



   Signed by and Reported on: __________              Angel Queen MD  [id number]



   



   -----------------------------------------------------------------------------
---------------



   



   



   



   



   



   



   



   



   



   



   



   



   



   



   



   



   



   



   



   



   



   



   



   



   



   



   



   



   



   



   



   



   



   



   



   



   



   



   



   ** END OF REPORT **



   



   DEPARTMENT OF PATHOLOGY,  [address] [place] [address]



   Phone # [phone]      Fax #[phone]



   Angel Queen M.D. Director     [facility] # [id number]

 

 2  [id number]

 

 3  SEE RESULT BELOW



   -----------------------------------------------------------------------------
---------------



   Name:  ALVINSUSAN                   : 1965    Attend Dr: Arvin Soto MD



   Acct:  [id number]  Unit: [id number]  AGE: 52            Location:  [facility]



   Re18                        SEX: F             Status:    DEP REF



   -----------------------------------------------------------------------------
---------------



   



   SPEC: [id number]             CHELO: 18-          SUBM DR: Arvin Soto MD



   REQ:  [id number]             RECD: 



   STATUS: YANELIS TRAYLOR DR: Castillo Naik MD



   _



   ORDERED:  LEVEL 4/2



   COMMENTS: [id number]



   



   FINAL DIAGNOSIS



   



   



   1.  Colon, 80 cm, biopsy:



   -- Hyperplastic polyp.



   



   2.   Colon, 20 cm, biopsy:



   -- Hyperplastic polyp.



   



   



   



   CLINICAL HISTORY



   



   Screening/Surveillance for malignancy in asymptomatic patient.



   



   POST-OPERATIVE DIAGNOSIS



   



   Colonoscopy to terminal ileum, prep excellent ? 5 mm polyp at 80 cm; 5 mm 
polyp at 20 cm.



   Conclusions/Plan: Await pathology



   



   GROSS DESCRIPTION



   



   1.   The specimen is received in formalin labeled, Colon Polyp at 80 cm, and 
consists of a



   0.3 x 0.2 x 0.2 cm tan-pink polypoid soft tissue fragment which is submitted 
entirely in one



   cassette.



   



   2.   The specimen is received in formalin labeled, Colon Polyp at 20 cm, and 
consists of two



   tan-pink polypoid soft tissue fragments averaging 0.3 x 0.2 x 0.2 cm which 
are submitted



   entirely in one cassette.



   



   Signed __________(signature on file)___________ Angel Queen MD  0950



   



   -----------------------------------------------------------------------------
---------------



   



   



   



   ** END OF REPORT **



   



   * ML=Testing performed at Main Lab



   DEPARTMENT OF PATHOLOGY,  [address] [place] [address]



   Phone # [phone]      Fax #[phone]



   Angel Queen M.D. Director     [facility] # [id number]







Procedures







 Date  CPT Code  Description  Status

 

 2018  65853  Colonoscopy Flexible W/Biopsy  Completed

 

 2018    Colonoscopy  Completed

 

 10/04/2017    Colonoscopy  Completed

 

 10/14/2016  91513  Stress Test  Completed

 

 10/14/2016  51448  Myocardial Perfusion Imaging Tomographic (Spect)  Completed



     Multiple Studies  

 

 2016  83658  ECHO Transthoracic, Real-Time 2D With Doppler And Color  
Completed



     Flow  

 

 2016  64962  EKG Tracing &amp; Interpretation  Completed

 

 2013  10519  Holter Monitoring 24 HR New  Completed

 

 2013  11935  ECHO Stress Test Incl Perf Contiuous ekg Monitoring  
Completed



     W/Phys Superv  

 

 2013  61881  ECHO Transthoracic, Real-Time 2D With Doppler And Color  
Completed



     Flow  

 

 2012  39256  EKG Tracing &amp; Interpretation  Completed







Encounters







 Type  Date  Location  Provider  CPT E/M  Dx

 

 Office [facility]  [facility]/2016  [facility] Kai Conde,  25949  R07.9



   2:00p  [facility], REMY, [facility]    

 

 Office Visit  2016  [facility] Kai Conde,  48562  R07.9



   2:00p  [facility], REMY, [facility]    

 

 [facility]  [facility]  
786.50



   3:15p  [facility]    

 

 [facility]  61284  
786.50



   12:15p  [facility], REMY, [facility]    









 785.1







Plan of Care

No Information Available

## 2019-12-05 ENCOUNTER — HOSPITAL ENCOUNTER (EMERGENCY)
Dept: HOSPITAL 25 - UCEAST | Age: 54
Discharge: HOME | End: 2019-12-05
Payer: COMMERCIAL

## 2019-12-05 VITALS — DIASTOLIC BLOOD PRESSURE: 88 MMHG | SYSTOLIC BLOOD PRESSURE: 140 MMHG

## 2019-12-05 DIAGNOSIS — E78.5: ICD-10-CM

## 2019-12-05 DIAGNOSIS — Z79.899: ICD-10-CM

## 2019-12-05 DIAGNOSIS — M25.561: Primary | ICD-10-CM

## 2019-12-05 DIAGNOSIS — F17.210: ICD-10-CM

## 2019-12-05 DIAGNOSIS — I10: ICD-10-CM

## 2019-12-05 DIAGNOSIS — M25.461: ICD-10-CM

## 2019-12-05 PROCEDURE — 86618 LYME DISEASE ANTIBODY: CPT

## 2019-12-05 PROCEDURE — 99211 OFF/OP EST MAY X REQ PHY/QHP: CPT

## 2019-12-05 PROCEDURE — G0463 HOSPITAL OUTPT CLINIC VISIT: HCPCS

## 2019-12-05 PROCEDURE — 36415 COLL VENOUS BLD VENIPUNCTURE: CPT

## 2019-12-05 NOTE — UC
Lower Extremity/Ankle HPI





- HPI Summary


HPI Summary: 





55 yo female presents with RIGHT knee pain. She tells me that over the last 

week has been feeling clicking in her right knee and has noticed some swelling 

to the area over the last 4 days. The swelling began after shoveling and 

plowing a lot of snow 4 days ago, but had no specific injury.  She believes the 

swelling is originating at her knee and going down into her calf. She denies 

specific injury, but it does hurt when she weight bears. She has been elevating 

and icing with little relief. Taking tylenol with no relief. She mentions that 

the last few stovall she has had diffuse joint pain that effects her wrists, 

fingers, hips, knees, and ankles - she saw her PCP for this and was apparently 

tested for RA and autoimmune disease that was all negative per pt. She denies 

fever, recent travel, SOB, chest pain. She does smoke daily. No known tick 

bites. 





- History of Current Complaint


Stated Complaint: RT LEG SWELLING


Time Seen by Provider: 12/05/19 17:30


Hx Obtained From: Patient


Onset/Duration: Gradual Onset


Severity Initially: Moderate


Severity Currently: Moderate


Pain Intensity: 7


Pain Scale Used: 0-10 Numeric


Aggravating Factor(s): Standing, Ambulation


Able to Bear Weight: Yes





- Allergies/Home Medications


Allergies/Adverse Reactions: 


 Allergies











Allergy/AdvReac Type Severity Reaction Status Date / Time


 


No Known Allergies Allergy   Verified 12/05/19 17:37











Home Medications: 


 Home Medications





Rosuvastatin Calcium [Crestor] 10 mg PO DAILY 12/05/19 [History Confirmed 12/05/ 19]


hydroCHLOROthiazide [Hydrochlorothiazide] 25 mg PO DAILY 12/05/19 [History 

Confirmed 12/05/19]











PMH/Surg Hx/FS Hx/Imm Hx


Endocrine History: Dyslipidemia


Cardiovascular History: Hypertension


Psychological History: Anxiety, Depression





- Surgical History


Surgical History: Yes


Surgery Procedure, Year, and Place: 2001 UTERINE FIBROID REMOVAL, [facility].  2002 

CSECTION, [facility]





- Family History


Known Family History: Positive: None





- Social History


Occupation: Employed Full-time


Lives: With Family


Alcohol Use: None


Alcohol Amount: 2 TIMES A YEAR


Substance Use Type: None


Smoking Status (MU): Light Every Day Tobacco Smoker


Type: Cigarettes


Amount Used/How Often: 1/2 ppd


Length of Time of Smoking/Using Tobacco: SINCE AGE 21


Have You Smoked in the Last Year: Yes


When Did the Patient Quit Smoking/Using Tobacco: SINCE AGE 21





Review of Systems


All Other Systems Reviewed And Are Negative: No


Constitutional: Positive: Negative


Skin: Positive: Negative


Respiratory: Positive: Negative


Cardiovascular: Positive: Negative


Neurovascular: Positive: Negative


Musculoskeletal: Positive: Other: - Right knee pain and swelling


Neurological: Positive: Negative


Psychological: Positive: Negative





Physical Exam





- Summary


Physical Exam Summary: 





GENERAL: NAD. WDWN. No pain distress.


SKIN: No rashes, sores, lesions, or open wounds.


CHEST:  No accessory muscle use. Breathing comfortably and in no distress.


CV:  Pulses intact PT and DP. Cap refill <2seconds


MSK: RIGHT KNEE: FROM. NTTP. Strength 5/5. Mild edema about right knee. No 

patella apprehension. Negative Lachman, A/P drawer, Lesvia, and varus/valgus 

stress. No calf edema or tenderness. Negative keysha sign. No palpable cord. 


NEURO: Alert. Sensations intact and symmetric B/L LEs


PSYCH: Age appropriate behavior.


Triage Information Reviewed: Yes


Vital Signs: 





Vital Signs:











Temp Pulse Resp BP Pulse Ox


 


 98.5 F   78   18   140/88   95 


 


 12/05/19 17:38  12/05/19 17:38  12/05/19 17:38  12/05/19 17:38  12/05/19 17:38











Vital Signs Reviewed: Yes





Diagnostics





- Radiology


  ** knee XR


Radiology Interpretation Completed By: Radiologist


Summary of Radiographic Findings: IMPRESSION: #. Very early osteoarthritis. 

Otherwise negative.





  ** Right leg US


Radiology Interpretation Completed By: Radiologist


Summary of Radiographic Findings: FINDINGS: Right deep veins: Unremarkable. The 

common femoral, femoral, proximal profunda femoral and popliteal veins are 

patent without thrombus. Normal Doppler waveforms. Normal compressibility and/

or augmentation response. Right superficial veins: Unremarkable. Saphenofemoral 

junction is patent without thrombus. Soft tissues: Diffuse edema. IMPRESSION: 

No evidence of deep vein thrombosis.





Lower Extremity Course/Dx





- Course


Course Of Treatment: 





XR as above.


US as above.


Suspect overuse injury from shoveling/snow plowing.


Advised to continue tylenol and RICE. F/u with Orthopedics. Will draw for lyme 

screen today, but have a low suspicion for this at this time as the knee is not 

warm or erythematous. 





- Differential Dx/Diagnosis


Provider Diagnosis: 


 Knee pain, Knee swelling








Discharge ED





- Sign-Out/Discharge


Documenting (check all that apply): Patient Departure


All imaging exams completed and their final reports reviewed: Yes





- Discharge Plan


Condition: Stable


Disposition: HOME


Patient Education Materials:  Swollen Knee Joint (ED)


Referrals: 


Deborah Nowak MD [Primary Care Provider] - 


Cecelia Lassiter MD [Medical Doctor] - As Soon As Possible


Additional Instructions: 


If you develop a fever, shortness of breath, chest pain, new or worsening 

symptoms - please call your PCP or go to the ED immediately.


 


The X-ray of your knee showed mild arthritis, but was otherwise normal.





The ultrasound was normal.





I recommend that you continue to take tylenol as directed and rest, ice, and 

elevate your knee to decrease pain and swelling.





Please call Orthopedics at the number below to schedule an appointment for 

further evaluation within 1-2 weeks





- Billing Disposition and Condition


Condition: STABLE


Disposition: Home